# Patient Record
Sex: MALE | Race: WHITE
[De-identification: names, ages, dates, MRNs, and addresses within clinical notes are randomized per-mention and may not be internally consistent; named-entity substitution may affect disease eponyms.]

---

## 2022-10-13 ENCOUNTER — NURSE TRIAGE (OUTPATIENT)
Dept: OTHER | Facility: CLINIC | Age: 47
End: 2022-10-13

## 2022-10-13 NOTE — TELEPHONE ENCOUNTER
Location of patient: [place]    Received call from [place] at [facility]; Patient with [employer] requesting to establish care with [facility]. Subjective: Caller states \"I have a cough that is causing some shortness of breath and some chest pain. \"     Current Symptoms: bad cough; sinus congestion, sob/chest pain with cough; productive white sputum; elevated blood sugar    Onset: 1 week ago; gradual, worsening    Associated Symptoms: reduced activity    Pain Severity: 9/10; aching; constant (chronic)    Temperature: Denies       What has been tried: OTC remedies    LMP: NA Pregnant: NA    Recommended disposition: See PCP within 24 hours. Advised to go to [facility] if unable to get appointment. Care advice provided, patient verbalizes understanding; denies any other questions or concerns; instructed to call back for any new or worsening symptoms. Patient/Caller agrees with recommended disposition; writer provided warm transfer to [employer] at [facility] for appointment scheduling    Attention Provider: Thank you for allowing me to participate in the care of your patient. The patient was connected to triage in response to information provided to the [facility]. Please do not respond through this encounter as the response is not directed to a shared pool.       Reason for Disposition   SEVERE coughing spells (e.g., whooping sound after coughing, vomiting after coughing)    Protocols used: Cough - Acute Productive-ADULT-AH

## 2022-11-02 ENCOUNTER — TELEMEDICINE (OUTPATIENT)
Dept: INTERNAL MEDICINE CLINIC | Facility: CLINIC | Age: 47
End: 2022-11-02
Payer: MEDICARE

## 2022-11-02 DIAGNOSIS — B37.2 CANDIDA INFECTION OF FLEXURAL SKIN: ICD-10-CM

## 2022-11-02 DIAGNOSIS — F32.A ANXIETY AND DEPRESSION: ICD-10-CM

## 2022-11-02 DIAGNOSIS — Z79.4 TYPE 2 DIABETES MELLITUS WITH HYPERGLYCEMIA, WITH LONG-TERM CURRENT USE OF INSULIN (HCC): ICD-10-CM

## 2022-11-02 DIAGNOSIS — F41.9 ANXIETY AND DEPRESSION: ICD-10-CM

## 2022-11-02 DIAGNOSIS — M54.9 CHRONIC NECK AND BACK PAIN: ICD-10-CM

## 2022-11-02 DIAGNOSIS — Z12.12 ENCOUNTER FOR SCREENING FOR COLORECTAL MALIGNANT NEOPLASM: ICD-10-CM

## 2022-11-02 DIAGNOSIS — E55.9 VITAMIN D DEFICIENCY: ICD-10-CM

## 2022-11-02 DIAGNOSIS — E66.01 CLASS 3 SEVERE OBESITY WITH SERIOUS COMORBIDITY AND BODY MASS INDEX (BMI) OF 45.0 TO 49.9 IN ADULT, UNSPECIFIED OBESITY TYPE (HCC): ICD-10-CM

## 2022-11-02 DIAGNOSIS — M54.2 CHRONIC NECK AND BACK PAIN: ICD-10-CM

## 2022-11-02 DIAGNOSIS — M25.562 CHRONIC PAIN OF BOTH KNEES: ICD-10-CM

## 2022-11-02 DIAGNOSIS — F17.200 TOBACCO DEPENDENCE: ICD-10-CM

## 2022-11-02 DIAGNOSIS — E11.65 TYPE 2 DIABETES MELLITUS WITH HYPERGLYCEMIA, WITH LONG-TERM CURRENT USE OF INSULIN (HCC): ICD-10-CM

## 2022-11-02 DIAGNOSIS — G89.29 CHRONIC PAIN OF BOTH KNEES: ICD-10-CM

## 2022-11-02 DIAGNOSIS — M25.561 CHRONIC PAIN OF BOTH KNEES: ICD-10-CM

## 2022-11-02 DIAGNOSIS — Z12.11 ENCOUNTER FOR SCREENING FOR COLORECTAL MALIGNANT NEOPLASM: ICD-10-CM

## 2022-11-02 DIAGNOSIS — M17.0 PRIMARY OSTEOARTHRITIS OF BOTH KNEES: ICD-10-CM

## 2022-11-02 DIAGNOSIS — I10 PRIMARY HYPERTENSION: Primary | ICD-10-CM

## 2022-11-02 DIAGNOSIS — G89.29 CHRONIC NECK AND BACK PAIN: ICD-10-CM

## 2022-11-02 DIAGNOSIS — E78.2 MIXED HYPERLIPIDEMIA: ICD-10-CM

## 2022-11-02 PROCEDURE — 99204 OFFICE O/P NEW MOD 45 MIN: CPT | Performed by: INTERNAL MEDICINE

## 2022-11-02 RX ORDER — FLUOXETINE HYDROCHLORIDE 20 MG/1
20 CAPSULE ORAL DAILY
Qty: 30 CAPSULE | Refills: 3 | Status: SHIPPED | OUTPATIENT
Start: 2022-11-02

## 2022-11-02 RX ORDER — HYDROCODONE BITARTRATE AND ACETAMINOPHEN 10; 325 MG/1; MG/1
TABLET ORAL
COMMUNITY
Start: 2022-10-19

## 2022-11-02 RX ORDER — INSULIN GLARGINE 100 [IU]/ML
40 INJECTION, SOLUTION SUBCUTANEOUS NIGHTLY
Qty: 15 ADJUSTABLE DOSE PRE-FILLED PEN SYRINGE | Refills: 1 | Status: SHIPPED | OUTPATIENT
Start: 2022-11-02

## 2022-11-02 RX ORDER — LOSARTAN POTASSIUM 25 MG/1
25 TABLET ORAL DAILY
Qty: 90 TABLET | Refills: 1 | Status: SHIPPED | OUTPATIENT
Start: 2022-11-02

## 2022-11-02 RX ORDER — BUPROPION HYDROCHLORIDE 300 MG/1
1 TABLET ORAL DAILY
COMMUNITY
Start: 2021-11-10 | End: 2022-11-16

## 2022-11-02 RX ORDER — PEN NEEDLE, DIABETIC 29 G X1/2"
NEEDLE, DISPOSABLE MISCELLANEOUS
COMMUNITY
Start: 2022-10-16

## 2022-11-02 RX ORDER — BLOOD SUGAR DIAGNOSTIC
STRIP MISCELLANEOUS
COMMUNITY
Start: 2022-09-02

## 2022-11-02 RX ORDER — ERTUGLIFLOZIN 15 MG/1
TABLET, FILM COATED ORAL
COMMUNITY
Start: 2022-10-12

## 2022-11-02 RX ORDER — ATORVASTATIN CALCIUM 40 MG/1
40 TABLET, FILM COATED ORAL DAILY
Qty: 30 TABLET | Refills: 3 | Status: SHIPPED | OUTPATIENT
Start: 2022-11-02

## 2022-11-02 RX ORDER — INSULIN LISPRO 100 [IU]/ML
12 INJECTION, SOLUTION INTRAVENOUS; SUBCUTANEOUS
Qty: 5 ADJUSTABLE DOSE PRE-FILLED PEN SYRINGE | Refills: 3 | Status: SHIPPED | OUTPATIENT
Start: 2022-11-02

## 2022-11-02 RX ORDER — KETOCONAZOLE 20 MG/ML
SHAMPOO TOPICAL
Qty: 1 EACH | Refills: 2 | Status: SHIPPED | OUTPATIENT
Start: 2022-11-02

## 2022-11-02 RX ORDER — KETOCONAZOLE 200 MG/1
200 TABLET ORAL DAILY
Qty: 14 TABLET | Refills: 0 | Status: SHIPPED | OUTPATIENT
Start: 2022-11-02 | End: 2022-11-16

## 2022-11-02 RX ORDER — NICOTINE 21 MG/24HR
1 PATCH, TRANSDERMAL 24 HOURS TRANSDERMAL DAILY
Qty: 42 PATCH | Refills: 3 | Status: SHIPPED | OUTPATIENT
Start: 2022-11-02 | End: 2023-04-19

## 2022-11-02 RX ORDER — INSULIN ASPART 100 [IU]/ML
INJECTION, SUSPENSION SUBCUTANEOUS
COMMUNITY
Start: 2022-10-22 | End: 2022-11-02

## 2022-11-02 ASSESSMENT — PATIENT HEALTH QUESTIONNAIRE - PHQ9
SUM OF ALL RESPONSES TO PHQ QUESTIONS 1-9: 0
SUM OF ALL RESPONSES TO PHQ9 QUESTIONS 1 & 2: 0
SUM OF ALL RESPONSES TO PHQ QUESTIONS 1-9: 0
2. FEELING DOWN, DEPRESSED OR HOPELESS: 0
SUM OF ALL RESPONSES TO PHQ QUESTIONS 1-9: 0
1. LITTLE INTEREST OR PLEASURE IN DOING THINGS: 0
SUM OF ALL RESPONSES TO PHQ QUESTIONS 1-9: 0

## 2022-11-02 ASSESSMENT — ENCOUNTER SYMPTOMS
ALLERGIC/IMMUNOLOGIC NEGATIVE: 1
EYES NEGATIVE: 1
GASTROINTESTINAL NEGATIVE: 1
BACK PAIN: 1
RESPIRATORY NEGATIVE: 1

## 2022-11-02 NOTE — PROGRESS NOTES
Merle Menon D.O. [facility]  [address], [place] [address]  Tel: [phone]    History and Physical Office Visit     Patient Name: Merle Menon    :  1975   MRN:   [id number]      Today's Date: 22 10:23 AM    Subjective     The patient is a 41y.o. year old male with a pmh as listed below. The patient presents today to establish care. He has a history of T2 DM on Novolog, Metformin, and Steglatro. He was previously seeing Dr. Lynn Ibrahim, who was his old physician. His Last A1c was 12.5. He follows with pain management for his knees and back. He has bone on bone OA in his left knee. He has not seen orthopedic surgeon. Family history of cancer: uncle  from he thinks prostate cancer, grandfather had some type of cancer he is not sure of   Family history of heart disease/early onset MI: He states his father has had some heart attacks with his first one being at 64. Diet: he states he tries to eat healthy, he is trying to do away with soft drinks   Exercise: He can't due to his pain in his knees. Alcohol: None   Cigarettes: he smokes about a pack a day, he is trying to cut back and quit  Sexually active: he is  with 2 kids   Occupation: he is unemployed, former      Health Maintenance:  -he has not had a colonoscopy    New complaints:  He states his recent blood sugars have been so high that his meter doesn't even read sometimes. He states 3-4 days ago it was over 600. He states he also has high blood pressure often and a rash that develops in his flexural areas that his old doctor told it was due to his diabetes. He states his old doctor put him on ketoconazole oral medication for 14 days and it did help with the rashes. He is requesting more this today. He also states that his depression is not well controlled by Wellbutrin. He would like to start a new medication to help with his depression and anxiety.   He states he is trying to cut down on cigarettes and eat healthier. He has no new complaints today other than his chronic pain and he has an appointment with his pain management doctor coming up to get x-rays of his neck. Review of Systems   Constitutional: Negative. HENT: Negative. Eyes: Negative. Respiratory: Negative. Cardiovascular: Negative. Gastrointestinal: Negative. Endocrine: Negative. Genitourinary: Negative. Musculoskeletal:  Positive for arthralgias and back pain. Skin: Negative. Allergic/Immunologic: Negative. Neurological: Negative. Psychiatric/Behavioral: Negative. Negative for suicidal ideas.        Past Medical History:   Diagnosis Date    Anxiety and depression 11/2/2022    Candida infection of flexural skin 11/2/2022    Chronic neck and back pain 11/2/2022    Chronic pain of both knees 11/2/2022    Class 3 severe obesity with serious comorbidity and body mass index (BMI) of 45.0 to 49.9 in adult [facility]) 11/2/2022    Mixed hyperlipidemia 11/2/2022    Primary hypertension 11/2/2022    Primary osteoarthritis of both knees 11/2/2022    Tobacco dependence 11/2/2022    Type 2 diabetes mellitus with hyperglycemia, with long-term current use of insulin ([facility] [address] 11/2/2022     Social History     Socioeconomic History    Marital status: Single     Spouse name: Not on file    Number of children: Not on file    Years of education: Not on file    Highest education level: Not on file   Occupational History    Not on file   Tobacco Use    Smoking status: Every Day     Packs/day: 1.00     Years: 30.00     Pack years: 30.00     Types: Cigarettes    Smokeless tobacco: Never   Vaping Use    Vaping Use: Never used   Substance and Sexual Activity    Alcohol use: Not Currently    Drug use: Not Currently    Sexual activity: Not on file   Other Topics Concern    Not on file   Social History Narrative    Not on file     Social Determinants of Health     Financial Resource Strain: Not on file   Food Insecurity: numbness. No tingling. Alert and oriented. At baseline. No confusion. Patellar Reflexes 2+. Psychiatric: Normal thought content. Normal behavior. Normal judgment. Recommendations     Assessment:  Patient Active Problem List   Diagnosis    Type 2 diabetes mellitus with hyperglycemia, with long-term current use of insulin ([facility])    Primary hypertension    Mixed hyperlipidemia    Chronic neck and back pain    Chronic pain of both knees    Anxiety and depression    Class 3 severe obesity with serious comorbidity and body mass index (BMI) of 45.0 to 49.9 in adult [facility])    Primary osteoarthritis of both knees    Candida infection of flexural skin    Tobacco dependence      Status of Medical Conditions: Unstable    Plan:  -The patient is a 44-year-old male who presents to [facility] care. He has a history of type 2 diabetes and hypertension and obesity and chronic pain. He is not on any current hypertensive medication and he is not on optimal treatment for his diabetes either his last A1c in August was 12.5. We had a very lengthy discussion about the complications of diabetes and how to prevent these and that he has a higher risk for cardiovascular and cerebrovascular disease due to his family history and his obesity, hypertension, and uncontrolled diabetes. We discussed changing his medications and discontinuing his current insulin and adding a long-acting and short acting insulin to his regimen as well as Rybelsus. He would also like to start a new antidepressant and we will start him on Prozac 20 mg. We will also start him on losartan for blood pressure and microvascular complication protection. We will also start him on Lipitor 40 mg for macrovascular complications and cardiovascular disease risk.  -We also had a lengthy discussion about smoking cessation. He is currently on Wellbutrin and we will also prescribe nicotine patches.   I recommend the patient cut down by 1 cigarette/week so that he can quit in 20 weeks.  -Lipitor 40 mg  -Start losartan 25 mg  -Start nicotine patches 21 mg per 24 hours  -Ketoconazole 200 mg for 14 days and then ketoconazole cream thereafter  -Start Prozac 20 mg  -Start Basaglar 40 units at night  -Start AdmeLog 12 units 15 to 20 minutes before meals  -Continue metformin 1000 mg twice daily and Steglatro 15 mg daily  -Start Rybelsus 3 mg daily for 30 days and then 7 mg daily daily for weight loss and diabetes  -We will recheck his labs including a hemoglobin A1c, CBC, BMP, lipid panel, vitamin D, and microalbumin  -We will also order a colonoscopy for the patient  -He will need a foot exam and a diabetic eye exam as well. Smoking cessation options discussed with the patient. Advised patient that it is in his best interest to quit smoking. All of the options for smoking cessation including, but not limited to varenicline, bupropion, nictotine replacement, and CBT were discussed with the patient in detail. The patient has elected to participate in a smoking cessation program at this time. A total of 10 minutes was spent on risks, benefits, and options for smoking cessation.     -Previous medical records and/or labs/tests available to me reviewed, any records outstanding not available requested  -The risks, benefits, and medical necessity of all medications, tests labs, and any other orders that were ordered at today's visit were discussed with the patient including all elective or patient requested orders. Decision to order or not order tests or based on this risk/benefit ratio and medical necessity.  -The patient expresses understanding of the plan as I've explained it to him and is in agreement with the current plan. Follow up: 2 weeks for blood pressure check and med check. This visit was conducted using telehealth services of either [employer] or [employer]. The physical exam was limited due to the nature of the virtual visit.   The patient's consent was obtained to conduct such a visit. Total time face-to-face via video was 15 minutes. All concerns were discussed and addressed at this visit. If the patient has any other questions or concerns they are to call to schedule an in person appointment or if it is an emergency, go to the ER.     Total Time: 45 minutes (chart review and in-exam time)    Signed: Kaylene Hall D.O.  11/02/22  10:23 AM

## 2022-11-04 ENCOUNTER — NURSE ONLY (OUTPATIENT)
Dept: INTERNAL MEDICINE CLINIC | Facility: CLINIC | Age: 47
End: 2022-11-04

## 2022-11-04 DIAGNOSIS — Z79.4 TYPE 2 DIABETES MELLITUS WITH HYPERGLYCEMIA, WITH LONG-TERM CURRENT USE OF INSULIN (HCC): ICD-10-CM

## 2022-11-04 DIAGNOSIS — E11.65 TYPE 2 DIABETES MELLITUS WITH HYPERGLYCEMIA, WITH LONG-TERM CURRENT USE OF INSULIN (HCC): ICD-10-CM

## 2022-11-04 DIAGNOSIS — E78.2 MIXED HYPERLIPIDEMIA: ICD-10-CM

## 2022-11-04 DIAGNOSIS — E55.9 VITAMIN D DEFICIENCY: ICD-10-CM

## 2022-11-04 DIAGNOSIS — I10 PRIMARY HYPERTENSION: ICD-10-CM

## 2022-11-04 LAB
25(OH)D3 SERPL-MCNC: 17.3 NG/ML (ref 30–100)
ANION GAP SERPL CALC-SCNC: 7 MMOL/L (ref 2–11)
BASOPHILS # BLD: 0.1 K/UL (ref 0–0.2)
BASOPHILS NFR BLD: 1 % (ref 0–2)
BUN SERPL-MCNC: 9 MG/DL (ref 6–23)
CALCIUM SERPL-MCNC: 9.2 MG/DL (ref 8.3–10.4)
CHLORIDE SERPL-SCNC: 101 MMOL/L (ref 101–110)
CHOLEST SERPL-MCNC: 218 MG/DL
CO2 SERPL-SCNC: 26 MMOL/L (ref 21–32)
CREAT SERPL-MCNC: 0.9 MG/DL (ref 0.8–1.5)
DIFFERENTIAL METHOD BLD: ABNORMAL
EOSINOPHIL # BLD: 0.3 K/UL (ref 0–0.8)
EOSINOPHIL NFR BLD: 2 % (ref 0.5–7.8)
ERYTHROCYTE [DISTWIDTH] IN BLOOD BY AUTOMATED COUNT: 12.9 % (ref 11.9–14.6)
GLUCOSE SERPL-MCNC: 389 MG/DL (ref 65–100)
HCT VFR BLD AUTO: 48.1 % (ref 41.1–50.3)
HDLC SERPL-MCNC: 23 MG/DL (ref 40–60)
HDLC SERPL: 9.5 {RATIO}
HGB BLD-MCNC: 15.5 G/DL (ref 13.6–17.2)
IMM GRANULOCYTES # BLD AUTO: 0.1 K/UL (ref 0–0.5)
IMM GRANULOCYTES NFR BLD AUTO: 0 % (ref 0–5)
LDLC SERPL CALC-MCNC: ABNORMAL MG/DL
LYMPHOCYTES # BLD: 3.5 K/UL (ref 0.5–4.6)
LYMPHOCYTES NFR BLD: 25 % (ref 13–44)
MCH RBC QN AUTO: 28.1 PG (ref 26.1–32.9)
MCHC RBC AUTO-ENTMCNC: 32.2 G/DL (ref 31.4–35)
MCV RBC AUTO: 87.3 FL (ref 82–102)
MONOCYTES # BLD: 0.9 K/UL (ref 0.1–1.3)
MONOCYTES NFR BLD: 6 % (ref 4–12)
NEUTS SEG # BLD: 9.2 K/UL (ref 1.7–8.2)
NEUTS SEG NFR BLD: 66 % (ref 43–78)
NRBC # BLD: 0 K/UL (ref 0–0.2)
PLATELET # BLD AUTO: 362 K/UL (ref 150–450)
PMV BLD AUTO: 10.4 FL (ref 9.4–12.3)
POTASSIUM SERPL-SCNC: 4.8 MMOL/L (ref 3.5–5.1)
RBC # BLD AUTO: 5.51 M/UL (ref 4.23–5.6)
SODIUM SERPL-SCNC: 134 MMOL/L (ref 133–143)
TRIGL SERPL-MCNC: 602 MG/DL (ref 35–150)
VLDLC SERPL CALC-MCNC: 120.4 MG/DL (ref 6–23)
WBC # BLD AUTO: 14 K/UL (ref 4.3–11.1)

## 2022-11-05 LAB
EST. AVERAGE GLUCOSE BLD GHB EST-MCNC: 301 MG/DL
HBA1C MFR BLD: 12.1 % (ref 4.8–5.6)

## 2022-11-06 LAB — LDLC SERPL DIRECT ASSAY-MCNC: 93 MG/DL

## 2022-11-07 DIAGNOSIS — E78.1 HYPERTRIGLYCERIDEMIA: Primary | ICD-10-CM

## 2022-11-07 RX ORDER — FENOFIBRATE 160 MG/1
160 TABLET ORAL DAILY
Qty: 90 TABLET | Refills: 1 | Status: SHIPPED | OUTPATIENT
Start: 2022-11-07

## 2022-11-08 ENCOUNTER — TELEPHONE (OUTPATIENT)
Dept: INTERNAL MEDICINE CLINIC | Facility: CLINIC | Age: 47
End: 2022-11-08

## 2022-11-08 DIAGNOSIS — E11.65 TYPE 2 DIABETES MELLITUS WITH HYPERGLYCEMIA, WITH LONG-TERM CURRENT USE OF INSULIN (HCC): Primary | ICD-10-CM

## 2022-11-08 DIAGNOSIS — Z79.4 TYPE 2 DIABETES MELLITUS WITH HYPERGLYCEMIA, WITH LONG-TERM CURRENT USE OF INSULIN (HCC): ICD-10-CM

## 2022-11-08 DIAGNOSIS — E11.65 TYPE 2 DIABETES MELLITUS WITH HYPERGLYCEMIA, WITH LONG-TERM CURRENT USE OF INSULIN (HCC): ICD-10-CM

## 2022-11-08 DIAGNOSIS — Z79.4 TYPE 2 DIABETES MELLITUS WITH HYPERGLYCEMIA, WITH LONG-TERM CURRENT USE OF INSULIN (HCC): Primary | ICD-10-CM

## 2022-11-08 NOTE — TELEPHONE ENCOUNTER
Patient picked up his prescription for  insulin lispro, 1 Unit Dial, (ADMELOG SOLOSTAR) 100 UNIT/ML SOPN  insulin glargine (BASAGLAR KWIKPEN) 100 UNIT/ML injection pen  Needs pen needles for insulin. They were not included.   [address]

## 2022-11-09 LAB
CREAT UR-MCNC: 43 MG/DL
MICROALBUMIN UR-MCNC: 1.85 MG/DL (ref 0–3)
MICROALBUMIN/CREAT UR-RTO: 43 MG/G (ref 0–30)

## 2022-11-16 ENCOUNTER — OFFICE VISIT (OUTPATIENT)
Dept: INTERNAL MEDICINE CLINIC | Facility: CLINIC | Age: 47
End: 2022-11-16
Payer: MEDICARE

## 2022-11-16 VITALS
HEIGHT: 71 IN | DIASTOLIC BLOOD PRESSURE: 89 MMHG | OXYGEN SATURATION: 92 % | SYSTOLIC BLOOD PRESSURE: 156 MMHG | RESPIRATION RATE: 14 BRPM | HEART RATE: 87 BPM | WEIGHT: 315 LBS | TEMPERATURE: 98.2 F | BODY MASS INDEX: 44.1 KG/M2

## 2022-11-16 DIAGNOSIS — E11.65 TYPE 2 DIABETES MELLITUS WITH HYPERGLYCEMIA, WITH LONG-TERM CURRENT USE OF INSULIN (HCC): Primary | ICD-10-CM

## 2022-11-16 DIAGNOSIS — Z79.4 TYPE 2 DIABETES MELLITUS WITH HYPERGLYCEMIA, WITH LONG-TERM CURRENT USE OF INSULIN (HCC): Primary | ICD-10-CM

## 2022-11-16 PROCEDURE — 3079F DIAST BP 80-89 MM HG: CPT | Performed by: INTERNAL MEDICINE

## 2022-11-16 PROCEDURE — 99214 OFFICE O/P EST MOD 30 MIN: CPT | Performed by: INTERNAL MEDICINE

## 2022-11-16 PROCEDURE — 3046F HEMOGLOBIN A1C LEVEL >9.0%: CPT | Performed by: INTERNAL MEDICINE

## 2022-11-16 PROCEDURE — 3077F SYST BP >= 140 MM HG: CPT | Performed by: INTERNAL MEDICINE

## 2022-11-16 ASSESSMENT — PATIENT HEALTH QUESTIONNAIRE - PHQ9
SUM OF ALL RESPONSES TO PHQ9 QUESTIONS 1 & 2: 0
7. TROUBLE CONCENTRATING ON THINGS, SUCH AS READING THE NEWSPAPER OR WATCHING TELEVISION: 0
5. POOR APPETITE OR OVEREATING: 0
8. MOVING OR SPEAKING SO SLOWLY THAT OTHER PEOPLE COULD HAVE NOTICED. OR THE OPPOSITE, BEING SO FIGETY OR RESTLESS THAT YOU HAVE BEEN MOVING AROUND A LOT MORE THAN USUAL: 0
3. TROUBLE FALLING OR STAYING ASLEEP: 0
SUM OF ALL RESPONSES TO PHQ QUESTIONS 1-9: 0
4. FEELING TIRED OR HAVING LITTLE ENERGY: 0
SUM OF ALL RESPONSES TO PHQ QUESTIONS 1-9: 0
10. IF YOU CHECKED OFF ANY PROBLEMS, HOW DIFFICULT HAVE THESE PROBLEMS MADE IT FOR YOU TO DO YOUR WORK, TAKE CARE OF THINGS AT HOME, OR GET ALONG WITH OTHER PEOPLE: 0
6. FEELING BAD ABOUT YOURSELF - OR THAT YOU ARE A FAILURE OR HAVE LET YOURSELF OR YOUR FAMILY DOWN: 0
1. LITTLE INTEREST OR PLEASURE IN DOING THINGS: 0
2. FEELING DOWN, DEPRESSED OR HOPELESS: 0
SUM OF ALL RESPONSES TO PHQ QUESTIONS 1-9: 0
SUM OF ALL RESPONSES TO PHQ QUESTIONS 1-9: 0
9. THOUGHTS THAT YOU WOULD BE BETTER OFF DEAD, OR OF HURTING YOURSELF: 0

## 2022-11-16 ASSESSMENT — ENCOUNTER SYMPTOMS
GASTROINTESTINAL NEGATIVE: 1
RESPIRATORY NEGATIVE: 1

## 2022-11-16 NOTE — PROGRESS NOTES
Leonora Navas D.O. [facility]  [address], [address] [place] [address]  Tel: [phone]    Office Visit: Follow Up     Patient Name: Leonora Navas   :  1975   MRN:   [id number]      Today's Date: 22 2:53 PM    Subjective     The patient is a 41y.o.-year-old male who presents for follow-up. Today:   He has been checking his blood sugars at home a few times, he states the last sugar he took was 501 which was before he started taking the insulin, he states he just started taking his insulin. He has not been checking his BP at home. He states he will schedule an eye exam soon. He has not heard from his colonoscopy. He is down to 8 cigarettes per day. He states he is not taking his Wellbutrin anymore and does not want to take anymore but he is using nicotine patches. Review of Systems   Constitutional: Negative. HENT: Negative. Respiratory: Negative. Cardiovascular: Negative. Gastrointestinal: Negative. Genitourinary: Negative. Musculoskeletal: Negative. Skin: Negative. Neurological: Negative. Psychiatric/Behavioral: Negative.         Past Medical History:   Diagnosis Date    Anxiety and depression 2022    Candida infection of flexural skin 2022    Chronic neck and back pain 2022    Chronic pain of both knees 2022    Class 3 severe obesity with serious comorbidity and body mass index (BMI) of 45.0 to 49.9 in adult [facility]) 2022    Mixed hyperlipidemia 2022    Primary hypertension 2022    Primary osteoarthritis of both knees 2022    Tobacco dependence 2022    Type 2 diabetes mellitus with hyperglycemia, with long-term current use of insulin ([facility] [address] 2022     Social History     Socioeconomic History    Marital status: Single     Spouse name: Not on file    Number of children: Not on file    Years of education: Not on file    Highest education level: Not on file   Occupational History    Not on file   Tobacco Use    Smoking status: Every Day     Packs/day: 1.00     Years: 30.00     Pack years: 30.00     Types: Cigarettes    Smokeless tobacco: Never    Tobacco comments: Only smoking 8 cigarettes a day    Vaping Use    Vaping Use: Never used   Substance and Sexual Activity    Alcohol use: Not Currently    Drug use: Not Currently    Sexual activity: Not on file   Other Topics Concern    Not on file   Social History Narrative    Not on file     Social Determinants of Health     Financial Resource Strain: Not on file   Food Insecurity: Not on file   Transportation Needs: Not on file   Physical Activity: Not on file   Stress: Not on file   Social Connections: Not on file   Intimate Partner Violence: Not on file   Housing Stability: Not on file         Current Outpatient Medications   Medication Sig    Insulin Pen Needle 32G X 4 MM MISC 1 each by Does not apply route 2 times daily    fenofibrate (TRIGLIDE) 160 MG tablet Take 1 tablet by mouth daily    STEGLATRO 15 MG TABS TAKE 1 TABLET BY MOUTH EVERY DAY    HYDROcodone-acetaminophen (NORCO)  MG per tablet TAKE 1 TABLET BY MOUTH EVERY 6 HOURS    BD INSULIN SYRINGE U/F 30G X 1/2\" 0.5 ML MISC USE AS DIRECTED TWICE A DAY    ONETOUCH ULTRA strip 1 EACH BY OTHER ROUTE DAILY. ketoconazole (NIZORAL) 200 MG tablet Take 1 tablet by mouth daily for 14 days    ketoconazole (NIZORAL) 2 % shampoo Apply topically daily as needed.     nicotine (NICODERM CQ) 21 MG/24HR Place 1 patch onto the skin daily    atorvastatin (LIPITOR) 40 MG tablet Take 1 tablet by mouth daily    losartan (COZAAR) 25 MG tablet Take 1 tablet by mouth daily    insulin glargine (BASAGLAR KWIKPEN) 100 UNIT/ML injection pen Inject 40 Units into the skin nightly    insulin lispro, 1 Unit Dial, (ADMELOG SOLOSTAR) 100 UNIT/ML SOPN Inject 12 Units into the skin 3 times daily (before meals) 15-20 minutes before meals    FLUoxetine (PROZAC) 20 MG capsule Take 1 capsule by mouth daily    Semaglutide 3 MG TABS Take 1 tablet by mouth daily    metFORMIN (GLUCOPHAGE) 500 MG tablet Take 2 tablets by mouth 2 times daily (Patient not taking: Reported on 11/16/2022)    buPROPion (WELLBUTRIN XL) 300 MG extended release tablet Take 1 tablet by mouth daily (Patient not taking: Reported on 11/16/2022)     No current facility-administered medications for this visit. Objective     Vitals:    11/16/22 1443 11/16/22 1451   BP: (!) 156/95 (!) 156/89   Site: Left Lower Arm Right Lower Arm   Position: Sitting Sitting   Cuff Size: Large Adult Large Adult   Pulse: 87    Resp: 14    Temp: 98.2 °F (36.8 °C)    TempSrc: Temporal    SpO2: 92%    Weight: (!) 359 lb (162.8 kg)    Height: 5' 11\" (1.803 m)       Physical Exam:  Constitutional: Appears well kempt. Alert/oriented x3. In no acute distress. Head: Normocephalic No trauma. No deformity. Cardiac: Heart with normal rate/rhythm. No murmurs. No gallops. Pulses normal.   Pulmonary: Lungs clear to auscultation bilaterally. In no respiratory distress. No wheezing. No rales. No rhonci. Psychiatric: Normal thought content. Normal behavior. Normal judgment.      Diabetic foot exam:     Left Foot:   Visual Exam: normal    Pulse PT: 2+ (normal)   Filament test: normal sensation          Right Foot:   Visual Exam: normal    Pulse PT: 2+ (normal)   Filament test: normal sensation     Recommendations     Assessment:  Patient Active Problem List   Diagnosis    Type 2 diabetes mellitus with hyperglycemia, with long-term current use of insulin ([facility])    Primary hypertension    Mixed hyperlipidemia    Chronic neck and back pain    Chronic pain of both knees    Anxiety and depression    Class 3 severe obesity with serious comorbidity and body mass index (BMI) of 45.0 to 49.9 in [facility])    Primary osteoarthritis of both knees    Candida infection of flexural skin    Tobacco dependence    Hypertriglyceridemia      Status of above conditions: Blood pressure mildly elevated today    Plan:  -Patient is a 57-year-old male who presents for follow-up on diabetes. Last visit we started him on insulin and Rybelsus in addition to his metformin and Steglatro. His A1c was 12.1. He states he just started the insulin because he could not pick it up right away because he did not have money for it. He has not been checking his blood sugars while on the insulin. He states he has been compliant with all of his medications. He does not want to take the Wellbutrin anymore and will just do nicotine patches and try and cut down by 1 cigarette/week. -Ophthalmology referral for diabetic eye exam  -Diabetic foot exam performed today  -Continue all other medications as prescribed and refill metformin. When he is finished with his Rybelsus 3 mg, he will  the 7 mg tabs and start taking those  -Discussed with the patient that we may have to increase his insulin or blood pressure medication in the future  -Encouraged patient to check his blood sugars at home and keep a log    -Previous medical records and/or labs/tests available to me reviewed, any records outstanding not available requested  -The risks, benefits, and medical necessity of all medications, tests labs, and any other orders that were ordered at today's visit were discussed with the patient including all elective or patient requested orders. Decision to order or not order tests or based on this risk/benefit ratio and medical necessity.  -The patient expresses understanding of the plan as I've explained it to him and is in agreement with the current plan.     Follow Up: 3 months with A1c check and blood pressure check    Total Time: 30 minutes (chart reviewing and in-exam time)    Signed: Sy De La Rosa D.O.  11/16/22  2:53 PM

## 2023-02-27 DIAGNOSIS — E11.65 TYPE 2 DIABETES MELLITUS WITH HYPERGLYCEMIA, WITH LONG-TERM CURRENT USE OF INSULIN (HCC): ICD-10-CM

## 2023-02-27 DIAGNOSIS — Z79.4 TYPE 2 DIABETES MELLITUS WITH HYPERGLYCEMIA, WITH LONG-TERM CURRENT USE OF INSULIN (HCC): ICD-10-CM

## 2023-02-27 NOTE — TELEPHONE ENCOUNTER
As of last office note (11/16/2022), patient is still taking this medication and will be out of medication by their appointment (03/13/2023). Pended medication has correct quantity and pended to preferred pharmacy.

## 2023-03-02 ENCOUNTER — CLINICAL DOCUMENTATION (OUTPATIENT)
Dept: SURGERY | Age: 48
End: 2023-03-02

## 2023-03-02 NOTE — PROGRESS NOTES
I have reviewed the patient's chart and consider the patient an acceptable risk for screening colonoscopy without a formal office visit. We will contact the patient to give the details of the bowel prep and to schedule screening colonoscopy in the near future. Colonoscopy: none on file in Epic  Anticoagulation: none  Family Hx: non contributory       Once the colonoscopy has been completed, the Health Maintenance will be updated accordingly.      Yvonne Sawyer, APRN - CNP

## 2023-03-03 ENCOUNTER — PREP FOR PROCEDURE (OUTPATIENT)
Dept: SURGERY | Age: 48
End: 2023-03-03

## 2023-03-03 PROBLEM — Z12.11 SCREENING FOR COLON CANCER: Status: ACTIVE | Noted: 2023-03-03

## 2023-03-13 PROBLEM — B37.2 CANDIDA INFECTION OF FLEXURAL SKIN: Status: RESOLVED | Noted: 2022-11-02 | Resolved: 2023-03-13

## 2023-03-24 DIAGNOSIS — E11.65 TYPE 2 DIABETES MELLITUS WITH HYPERGLYCEMIA, WITH LONG-TERM CURRENT USE OF INSULIN (HCC): ICD-10-CM

## 2023-03-24 DIAGNOSIS — Z79.4 TYPE 2 DIABETES MELLITUS WITH HYPERGLYCEMIA, WITH LONG-TERM CURRENT USE OF INSULIN (HCC): ICD-10-CM

## 2023-03-29 DIAGNOSIS — B37.2 CANDIDA INFECTION OF FLEXURAL SKIN: ICD-10-CM

## 2023-03-29 RX ORDER — KETOCONAZOLE 20 MG/ML
SHAMPOO TOPICAL
Qty: 120 ML | Refills: 0 | Status: SHIPPED | OUTPATIENT
Start: 2023-03-29

## 2023-03-29 NOTE — TELEPHONE ENCOUNTER
As of last office note (11/16/2022), patient is still taking this medication and will be out of medication by their appointment (04/18/2023). Pended medication has correct quantity and pended to preferred pharmacy.

## 2023-04-02 PROBLEM — Z12.11 SCREENING FOR COLON CANCER: Status: RESOLVED | Noted: 2023-03-03 | Resolved: 2023-04-02

## 2023-04-03 DIAGNOSIS — Z79.4 TYPE 2 DIABETES MELLITUS WITH HYPERGLYCEMIA, WITH LONG-TERM CURRENT USE OF INSULIN (HCC): ICD-10-CM

## 2023-04-03 DIAGNOSIS — E11.65 TYPE 2 DIABETES MELLITUS WITH HYPERGLYCEMIA, WITH LONG-TERM CURRENT USE OF INSULIN (HCC): ICD-10-CM

## 2023-04-03 NOTE — TELEPHONE ENCOUNTER
As of last office note (11/16/2022), patient is still taking this medication and will be out of medication by their appointment (04/18/2023). Pended medication has correct quantity and pended to preferred pharmacy. A 15 day supply has been pended to sustain patient until his appointment. Medication request forwarded to Joy Schaefer (On Call Provider) in absence of Twila Hammonds

## 2023-04-26 DIAGNOSIS — E78.2 MIXED HYPERLIPIDEMIA: ICD-10-CM

## 2023-04-26 DIAGNOSIS — B37.2 CANDIDA INFECTION OF FLEXURAL SKIN: ICD-10-CM

## 2023-04-26 RX ORDER — ATORVASTATIN CALCIUM 40 MG/1
TABLET, FILM COATED ORAL
Qty: 30 TABLET | Refills: 3 | OUTPATIENT
Start: 2023-04-26

## 2023-04-26 RX ORDER — KETOCONAZOLE 20 MG/ML
SHAMPOO TOPICAL
Qty: 120 ML | Refills: 0 | OUTPATIENT
Start: 2023-04-26

## 2023-05-19 ENCOUNTER — PREP FOR PROCEDURE (OUTPATIENT)
Dept: SURGERY | Age: 48
End: 2023-05-19

## 2023-05-30 RX ORDER — SODIUM CHLORIDE 0.9 % (FLUSH) 0.9 %
5-40 SYRINGE (ML) INJECTION EVERY 12 HOURS SCHEDULED
Status: CANCELLED | OUTPATIENT
Start: 2023-05-30

## 2023-05-30 RX ORDER — SODIUM CHLORIDE 9 MG/ML
INJECTION, SOLUTION INTRAVENOUS PRN
Status: CANCELLED | OUTPATIENT
Start: 2023-05-30

## 2023-05-30 RX ORDER — SODIUM CHLORIDE 0.9 % (FLUSH) 0.9 %
5-40 SYRINGE (ML) INJECTION PRN
Status: CANCELLED | OUTPATIENT
Start: 2023-05-30

## 2023-06-13 DIAGNOSIS — E78.1 HYPERTRIGLYCERIDEMIA: ICD-10-CM

## 2023-06-13 RX ORDER — FENOFIBRATE 160 MG/1
TABLET ORAL
Qty: 30 TABLET | Refills: 5 | OUTPATIENT
Start: 2023-06-13

## 2023-09-30 ENCOUNTER — APPOINTMENT (OUTPATIENT)
Dept: CT IMAGING | Age: 48
End: 2023-09-30
Payer: MEDICAID

## 2023-09-30 ENCOUNTER — HOSPITAL ENCOUNTER (EMERGENCY)
Age: 48
Discharge: HOME | End: 2023-09-30
Attending: EMERGENCY MEDICINE
Payer: MEDICAID

## 2023-09-30 VITALS
RESPIRATION RATE: 16 BRPM | HEIGHT: 71 IN | OXYGEN SATURATION: 97 % | WEIGHT: 315 LBS | TEMPERATURE: 98.4 F | DIASTOLIC BLOOD PRESSURE: 90 MMHG | HEART RATE: 78 BPM | SYSTOLIC BLOOD PRESSURE: 140 MMHG | BODY MASS INDEX: 44.1 KG/M2

## 2023-09-30 DIAGNOSIS — L04.9 ADENITIS, ACUTE: Primary | ICD-10-CM

## 2023-09-30 LAB
ANION GAP SERPL CALC-SCNC: 9 MMOL/L (ref 2–11)
BASOPHILS # BLD: 0.1 K/UL (ref 0–0.2)
BASOPHILS NFR BLD: 1 % (ref 0–2)
BUN SERPL-MCNC: 7 MG/DL (ref 6–23)
CALCIUM SERPL-MCNC: 8.5 MG/DL (ref 8.3–10.4)
CHLORIDE SERPL-SCNC: 100 MMOL/L (ref 101–110)
CO2 SERPL-SCNC: 24 MMOL/L (ref 21–32)
CREAT SERPL-MCNC: 1.1 MG/DL (ref 0.8–1.5)
DIFFERENTIAL METHOD BLD: ABNORMAL
EOSINOPHIL # BLD: 0.2 K/UL (ref 0–0.8)
EOSINOPHIL NFR BLD: 1 % (ref 0.5–7.8)
ERYTHROCYTE [DISTWIDTH] IN BLOOD BY AUTOMATED COUNT: 13.2 % (ref 11.9–14.6)
GLUCOSE SERPL-MCNC: 387 MG/DL (ref 65–100)
HCT VFR BLD AUTO: 48.1 % (ref 41.1–50.3)
HGB BLD-MCNC: 15.7 G/DL (ref 13.6–17.2)
IMM GRANULOCYTES # BLD AUTO: 0.1 K/UL (ref 0–0.5)
IMM GRANULOCYTES NFR BLD AUTO: 1 % (ref 0–5)
LYMPHOCYTES # BLD: 3.2 K/UL (ref 0.5–4.6)
LYMPHOCYTES NFR BLD: 22 % (ref 13–44)
MCH RBC QN AUTO: 28.1 PG (ref 26.1–32.9)
MCHC RBC AUTO-ENTMCNC: 32.6 G/DL (ref 31.4–35)
MCV RBC AUTO: 86 FL (ref 82–102)
MONOCYTES # BLD: 1.1 K/UL (ref 0.1–1.3)
MONOCYTES NFR BLD: 7 % (ref 4–12)
NEUTS SEG # BLD: 10 K/UL (ref 1.7–8.2)
NEUTS SEG NFR BLD: 68 % (ref 43–78)
NRBC # BLD: 0 K/UL (ref 0–0.2)
PLATELET # BLD AUTO: 277 K/UL (ref 150–450)
PMV BLD AUTO: 9.8 FL (ref 9.4–12.3)
POTASSIUM SERPL-SCNC: 4.5 MMOL/L (ref 3.5–5.1)
RBC # BLD AUTO: 5.59 M/UL (ref 4.23–5.6)
SODIUM SERPL-SCNC: 133 MMOL/L (ref 133–143)
WBC # BLD AUTO: 14.7 K/UL (ref 4.3–11.1)

## 2023-09-30 PROCEDURE — 85025 COMPLETE CBC W/AUTO DIFF WBC: CPT

## 2023-09-30 PROCEDURE — 80048 BASIC METABOLIC PNL TOTAL CA: CPT

## 2023-09-30 PROCEDURE — 6360000004 HC RX CONTRAST MEDICATION: Performed by: EMERGENCY MEDICINE

## 2023-09-30 PROCEDURE — 70491 CT SOFT TISSUE NECK W/DYE: CPT

## 2023-09-30 PROCEDURE — 99285 EMERGENCY DEPT VISIT HI MDM: CPT

## 2023-09-30 PROCEDURE — 96365 THER/PROPH/DIAG IV INF INIT: CPT

## 2023-09-30 PROCEDURE — 2580000003 HC RX 258: Performed by: EMERGENCY MEDICINE

## 2023-09-30 PROCEDURE — 6360000002 HC RX W HCPCS: Performed by: EMERGENCY MEDICINE

## 2023-09-30 RX ORDER — 0.9 % SODIUM CHLORIDE 0.9 %
500 INTRAVENOUS SOLUTION INTRAVENOUS ONCE
Status: COMPLETED | OUTPATIENT
Start: 2023-09-30 | End: 2023-09-30

## 2023-09-30 RX ORDER — CLINDAMYCIN HYDROCHLORIDE 150 MG/1
300 CAPSULE ORAL 3 TIMES DAILY
Qty: 42 CAPSULE | Refills: 0 | Status: SHIPPED | OUTPATIENT
Start: 2023-09-30 | End: 2023-09-30

## 2023-09-30 RX ORDER — AMOXICILLIN AND CLAVULANATE POTASSIUM 875; 125 MG/1; MG/1
1 TABLET, FILM COATED ORAL 2 TIMES DAILY
Qty: 20 TABLET | Refills: 0 | Status: SHIPPED | OUTPATIENT
Start: 2023-09-30 | End: 2023-10-10

## 2023-09-30 RX ORDER — CLINDAMYCIN PHOSPHATE 900 MG/50ML
900 INJECTION INTRAVENOUS
Status: DISCONTINUED | OUTPATIENT
Start: 2023-09-30 | End: 2023-09-30

## 2023-09-30 RX ADMIN — SODIUM CHLORIDE 500 ML: 9 INJECTION, SOLUTION INTRAVENOUS at 12:14

## 2023-09-30 RX ADMIN — SODIUM CHLORIDE 3000 MG: 900 INJECTION INTRAVENOUS at 15:20

## 2023-09-30 RX ADMIN — IOPAMIDOL 100 ML: 755 INJECTION, SOLUTION INTRAVENOUS at 13:16

## 2023-09-30 ASSESSMENT — LIFESTYLE VARIABLES
HOW OFTEN DO YOU HAVE A DRINK CONTAINING ALCOHOL: NEVER
HOW MANY STANDARD DRINKS CONTAINING ALCOHOL DO YOU HAVE ON A TYPICAL DAY: PATIENT DOES NOT DRINK

## 2023-09-30 ASSESSMENT — ENCOUNTER SYMPTOMS
VOICE CHANGE: 0
TROUBLE SWALLOWING: 0
COUGH: 0
VOMITING: 0
COLOR CHANGE: 0
SHORTNESS OF BREATH: 0

## 2023-09-30 ASSESSMENT — PAIN - FUNCTIONAL ASSESSMENT: PAIN_FUNCTIONAL_ASSESSMENT: 0-10

## 2023-09-30 ASSESSMENT — PAIN SCALES - GENERAL: PAINLEVEL_OUTOF10: 9

## 2023-09-30 ASSESSMENT — PAIN DESCRIPTION - LOCATION: LOCATION: NECK

## 2023-09-30 NOTE — ED NOTES
Report to [facility]. Care transferred at this time. Pt updated on plan of care. Jesica Cam, [place]  09/30/23 2143

## 2023-09-30 NOTE — ED PROVIDER NOTES
Emergency Department Provider Note       PCP: Abundio Espinal DO   Age: 57 y.o. Sex: male     DISPOSITION Decision To Discharge 09/30/2023 02:08:12 PM       ICD-10-CM    1. Adenitis, acute  L04.9 [id number] [facility],[place] [address] - Tano Yeung MD, Otolaryngology, [facility] Decision Making   Better with more swelling about the mandible. Will need repeat CT scan to assess for abscess. No airway issues. Complexity of Problems Addressed:  1 or more acute illnesses that pose a threat to life or bodily function. Data Reviewed and Analyzed:   I independently ordered and reviewed each unique test.  I reviewed external records: ED visit note from an outside group. Previous emergency department visit. Placed on Keflex. CT scan with stranding and soft tissue swelling and adenitis. No fluid collections          I interpreted the CT Scan with radiologist.  No fluid collections. Discussion of management or test interpretation. Patient without airway issues. Looks comfortable. Will change to clindamycin. Outpatient follow-up. ENT referral      Risk of Complications and/or Morbidity of Patient Management:  Prescription drug management performed. Chronic medical problems impacting care include DM. History      26-year-old male with 10-day history of some swelling and soreness on the left side of his throat. Has a history hypertension diabetes. Patient was seen in another emergency department 4 days ago. Had CT scan showing adenitis and some stranding of the tissues but no abscess. Placed on Keflex. States the swelling is gotten worse. No shortness of breath. No dysphagia. No change in his voice. No vomiting or diarrhea. No recent fever    The history is provided by the patient. Review of Systems   Constitutional:  Negative for chills and fever. HENT:  Negative for drooling, trouble swallowing and voice change. Respiratory:  Negative for cough and shortness of breath. times daily for 7 days        Past Medical History:   Diagnosis Date    Anxiety and depression 11/02/2022    Candida infection of flexural skin 11/02/2022    Chronic neck and back pain 11/02/2022    Chronic pain of both knees 11/02/2022    Class 3 severe obesity with serious comorbidity and body mass index (BMI) of 45.0 to 49.9 in adult [facility]) 11/02/2022    Mixed hyperlipidemia 11/02/2022    Primary hypertension 11/02/2022    Primary osteoarthritis of both knees 11/02/2022    Tobacco dependence 11/02/2022    Type 2 diabetes mellitus with hyperglycemia, with long-term current use of insulin ([address]) 11/02/2022    Does not check blood sugars        Past Surgical History:   Procedure Laterality Date    APPENDECTOMY      HERNIA REPAIR          Social History     Socioeconomic History    Marital status: Single   Tobacco Use    Smoking status: Every Day     Packs/day: 1.00     Years: 30.00     Additional pack years: 0.00     Total pack years: 30.00     Types: Cigarettes    Smokeless tobacco: Never    Tobacco comments:      Only smoking 8 cigarettes a day    Vaping Use    Vaping Use: Never used   Substance and Sexual Activity    Alcohol use: Not Currently    Drug use: Not Currently        Previous Medications    ATORVASTATIN (LIPITOR) 40 MG TABLET    Take 1 tablet by mouth daily    BD INSULIN SYRINGE U/F 30G X 1/2\" 0.5 ML MISC    USE AS DIRECTED TWICE A DAY    FENOFIBRATE (TRIGLIDE) 160 MG TABLET    Take 1 tablet by mouth daily    FLUOXETINE (PROZAC) 20 MG CAPSULE    Take 1 capsule by mouth daily    HYDROCODONE-ACETAMINOPHEN (NORCO)  MG PER TABLET    TAKE 1 TABLET BY MOUTH EVERY 6 HOURS    INSULIN GLARGINE (BASAGLAR KWIKPEN) 100 UNIT/ML INJECTION PEN    Inject 40 Units into the skin nightly    INSULIN LISPRO, 1 UNIT DIAL, (ADMELOG SOLOSTAR) 100 UNIT/ML SOPN    Inject 12 Units into the skin 3 times daily (before meals) 15-20 minutes before meals    INSULIN PEN NEEDLE 32G X 4 MM MISC    1 each by Does not apply route 2

## 2023-09-30 NOTE — ED TRIAGE NOTES
Patient arrives to ED pov from home. Patient reports he went to [facility] related to a knot on his left side of his neck. Patient reports they diagnosed him with facial cellulitis and put him on antibiotics. Patient reports it's getting worse so he came back to the ED. Denies issues swallowing or issues breathing.

## 2023-09-30 NOTE — DISCHARGE INSTRUCTIONS
Warmth compresses. Stop current antibiotic and start clindamycin. Referral to ear nose and throat doctor.   For shortness of breath or difficulty swallowing, recheck